# Patient Record
Sex: MALE | Race: WHITE | NOT HISPANIC OR LATINO | Employment: FULL TIME | URBAN - METROPOLITAN AREA
[De-identification: names, ages, dates, MRNs, and addresses within clinical notes are randomized per-mention and may not be internally consistent; named-entity substitution may affect disease eponyms.]

---

## 2018-10-29 ENCOUNTER — CONVERSION ENCOUNTER (OUTPATIENT)
Dept: OTOLARYNGOLOGY | Facility: CLINIC | Age: 53
End: 2018-10-29

## 2018-10-29 ENCOUNTER — OFFICE VISIT CONVERTED (OUTPATIENT)
Dept: OTOLARYNGOLOGY | Facility: CLINIC | Age: 53
End: 2018-10-29
Attending: OTOLARYNGOLOGY

## 2018-12-12 ENCOUNTER — OFFICE VISIT CONVERTED (OUTPATIENT)
Dept: OTOLARYNGOLOGY | Facility: CLINIC | Age: 53
End: 2018-12-12
Attending: OTOLARYNGOLOGY

## 2020-06-26 ENCOUNTER — HOSPITAL ENCOUNTER (OUTPATIENT)
Dept: GASTROENTEROLOGY | Facility: HOSPITAL | Age: 55
Setting detail: HOSPITAL OUTPATIENT SURGERY
Discharge: HOME | End: 2020-06-26
Attending: INTERNAL MEDICINE

## 2020-08-11 ENCOUNTER — OFFICE VISIT CONVERTED (OUTPATIENT)
Dept: GASTROENTEROLOGY | Facility: CLINIC | Age: 55
End: 2020-08-11
Attending: NURSE PRACTITIONER

## 2021-03-31 ENCOUNTER — OFFICE VISIT CONVERTED (OUTPATIENT)
Dept: GASTROENTEROLOGY | Facility: CLINIC | Age: 56
End: 2021-03-31
Attending: NURSE PRACTITIONER

## 2021-05-13 NOTE — PROGRESS NOTES
Progress Note      Patient Name: Josiah Díaz II   Patient ID: [id number]   Sex: Male   YOB: 1965    Primary Care Provider: Antoinette RILEY   Referring Provider: Antoinette RILEY    Visit Date: August 11, 2020    Provider: SHANNA Turner   Location: [facility]   Location Address: [address] [place] [address], [address]  [place], [place]  [id number]   Location Phone: [phone]          Chief Complaint  · Follow up of EGD/Colonoscopy      History Of Present Illness     Patient states that he has been dealing with heartburn for many years now. He is not currently on any medication as he prefers a more holistic approach. He is currently drinking aloe vera gel daily and avoiding spicy and acidic foods.  He also avoids caffeine and NSAID usage. Denies dysphagia, nausea, and vomiting.  Appetite and weight stable.    Bowel movement 1-2 times daily, formed stool.  Denies any abdominal pain, hematochezia, or melena.    FMH: Maternal grandmother- colon cancer, dx age 60's    EGD 6/26/2020:Normal mucosa of duodenum.  Normal mucosa of her stomach.  Hiatal hernia.  Irregularity in the Z line and GE junction.  Erythema in the GE junction compatible with esophagitis.  Colonoscopy 6/26/2020:Mild diverticulosis of the sigmoid colon.  6 mm polyp in the rectum.  Rectal polyp biopsytubular adenoma.  Antrum biopsyintestinal metaplasia consistent with Montez's esophagus.  Squamocolumnar mucosa with reactive changes.  No dysplasia identified.  GE junction biopsyintestinal metaplasia consistent with Montez's esophagus.  Squamocolumnar mucosa with reactive changes.  No dysplasia identified.       Past Medical History  Acid reflux; Chronic rhinosinusitis; Chronic sinusitis; Esophageal erosions; High triglycerides; Hypothyroidism; Rebound headache; Testicular hypofunction, men         Past Surgical History  Back Surgery, Lumbar; Colonoscopy; EGD; Thyroidectomy         Medication List  Cytomel  "5 mcg oral tablet; fenofibrate 50 mg oral capsule; levothyroxine 125 mcg oral tablet; montelukast 10 mg oral tablet; nortriptyline 25 mg oral capsule; propranolol-hydrochlorothiazid 80-25 mg oral tablet         Allergy List  NO KNOWN DRUG ALLERGIES       Allergies Reconciled  Family Medical History  Family history of colon cancer; Family history of bone cancer; Family history of heart disease; Family history of diabetes mellitus type II; Family history of hypothyroidism         Social History  Tobacco (Never)         Review of Systems  · Constitutional  o Denies  o : chills, fever  · Cardiovascular  o Denies  o : chest pain, dyspnea on exertion  · Respiratory  o Denies  o : cough, shortness of breath  · Gastrointestinal  o Admits  o : see HPI   · Endocrine  o Denies  o : weight gain, weight loss      Vitals  Date Time BP Position Site L\R Cuff Size HR RR TEMP (F) WT  HT  BMI kg/m2 BSA m2 O2 Sat HC       08/11/2020 09:22 /73 Sitting    80 - R   210lbs 2oz 6'  2\" 26.98 2.23           Physical Examination  · Constitutional  o Appearance  o : Healthy-appearing, awake and alert in no acute distress  · Head and Face  o Head  o : Normocephalic with no worriesome skin lesions  · Eyes  o Vision  o :   § Visual Fields  § : eyes move symmetrical in all directions  o Sclerae  o : sclerae anicteric  o Pupils and Irises  o : pupils equal and symmetrical  · Neck  o Inspection/Palpation  o : Trachea is midline, no adenopathy  · Respiratory  o Respiratory Effort  o : Breathing is unlabored.  o Inspection of Chest  o : normal appearance  o Auscultation of Lungs  o : Chest is clear to auscultation bilaterally.  · Cardiovascular  o Heart  o :   § Auscultation of Heart  § : no murmurs, rubs, or gallops  o Peripheral Vascular System  o :   § Extremities  § : no cyanosis, clubbing or edema;   · Gastrointestinal  o Abdominal Examination  o : Abdomen is soft, nontender to palpation, with normal active bowel sounds, no appreciable " hepatosplenomegaly.  o Digital Rectal Exam  o : deferred  · Skin and Subcutaneous Tissue  o General Inspection  o : without focal lesions; turgor is normal  · Psychiatric  o General  o : Alert and oriented x3  o Mood and Affect  o : Mood and affect are appropriate to circumstances          Assessment  · Montez's esophagus     530.85  · Diverticulosis Of Colon     562.10/K57.30  · Esophagitis, Reflux     530.11/K21.0  · Gastritis, Other specified     535.40  · Hernia, Hiatal     553.3/K44.9  · Colon polyp     211.3/K63.5      Plan  · Medications  o Protonix 20 mg oral tablet,delayed release (DR/EC)   SIG: take 1 tablet (20 mg) by oral route once daily for 90 days   DISP: (90) tablets with 1 refills  Prescribed on 08/11/2020     o Medications have been Reconciled  · Instructions  o Information given on current diagnoses.  o Lifestyle modifications discussed.  o Discussed risks of long-term PPI use.  o Electronically Identified Patient Education Materials Provided Electronically  · Disposition  o 6 month f/u            Electronically Signed by: SHANNA Turner -Author on August 11, 2020 09:40:22 AM

## 2021-05-14 VITALS
RESPIRATION RATE: 12 BRPM | BODY MASS INDEX: 28.62 KG/M2 | SYSTOLIC BLOOD PRESSURE: 130 MMHG | WEIGHT: 223 LBS | HEIGHT: 74 IN | HEART RATE: 69 BPM | DIASTOLIC BLOOD PRESSURE: 73 MMHG

## 2021-05-14 NOTE — PROGRESS NOTES
Progress Note      Patient Name: Josiah Díaz II   Patient ID: [id number]   Sex: Male   YOB: 1965    Primary Care Provider: Antoinette RILEY   Referring Provider: Antoinette RILEY    Visit Date: March 31, 2021    Provider: SHANNA Turner   Location: [facility]   Location Address: [address] [place] [address], [address]  [place], [place]  [id number]   Location Phone: [phone]          Chief Complaint  · Follow up GERD      History Of Present Illness     Mr. Díaz reports that he is doing well controlling heartburn with Protonix 20 mg as long as he does not overeat or eat close to bedtime. Denies any dysphagia, nausea, vomiting, change in appetite, or weight loss.  Rarely drinks caffeine or uses NSAID.     Bowel movement 1-2 times daily, formed stool.  Denies any abdominal pain, hematochezia, or melena.    FMH: Maternal grandmother- colon cancer, dx age 60's    EGD 6/26/2020:Normal mucosa of duodenum.  Normal mucosa of her stomach.  Hiatal hernia.  Irregularity in the Z line and GE junction.  Erythema in the GE junction compatible with esophagitis.  Colonoscopy 6/26/2020:Mild diverticulosis of the sigmoid colon.  6 mm polyp in the rectum.  Rectal polyp biopsytubular adenoma.  Antrum biopsy-intestinal metaplasia consistent with Montez's esophagus.  Squamocolumnar mucosa with reactive changes.  No dysplasia identified.  GE junction biopsy-intestinal metaplasia consistent with Montez's esophagus.  Squamocolumnar mucosa with reactive changes.  No dysplasia identified.            Past Medical History  Acid reflux; Chronic rhinosinusitis; Chronic sinusitis; Esophageal erosions; High triglycerides; Hypothyroidism; Rebound headache; Testicular hypofunction, men         Past Surgical History  Back Surgery, Lumbar; Colonoscopy; EGD; Thyroidectomy         Medication List  Cytomel 5 mcg oral tablet; fenofibrate 50 mg oral capsule; levothyroxine 125 mcg oral tablet;  "montelukast 10 mg oral tablet; nortriptyline 25 mg oral capsule; propranolol-hydrochlorothiazid 80-25 mg oral tablet; Protonix 20 mg oral tablet,delayed release (DR/EC)         Allergy List  NO KNOWN DRUG ALLERGIES       Allergies Reconciled  Family Medical History  Family history of colon cancer; Family history of bone cancer; Family history of heart disease; Family history of diabetes mellitus type II; Family history of hypothyroidism         Social History  Tobacco (Never)         Review of Systems  · Constitutional  o Denies  o : chills, fever  · Cardiovascular  o Denies  o : chest pain, dyspnea on exertion  · Respiratory  o Denies  o : cough, shortness of breath  · Gastrointestinal  o Admits  o : see HPI   · Endocrine  o Denies  o : weight gain, weight loss      Vitals  Date Time BP Position Site L\R Cuff Size HR RR TEMP (F) WT  HT  BMI kg/m2 BSA m2 O2 Sat FR L/min FiO2 HC       03/31/2021 09:23 /73 Sitting    69 - R 12  222lbs 16oz 6'  2\" 28.63 2.3             Physical Examination  · Constitutional  o Appearance  o : Healthy-appearing, awake and alert in no acute distress  · Head and Face  o Head  o : Normocephalic with no worriesome skin lesions  · Eyes  o Vision  o :   § Visual Fields  § : eyes move symmetrical in all directions  o Sclerae  o : sclerae anicteric  o Pupils and Irises  o : pupils equal and symmetrical  · Neck  o Inspection/Palpation  o : Trachea is midline, no adenopathy  · Respiratory  o Respiratory Effort  o : Breathing is unlabored.  o Inspection of Chest  o : normal appearance  o Auscultation of Lungs  o : Chest is clear to auscultation bilaterally.  · Cardiovascular  o Heart  o :   § Auscultation of Heart  § : no murmurs, rubs, or gallops  o Peripheral Vascular System  o :   § Extremities  § : no cyanosis, clubbing or edema;   · Gastrointestinal  o Abdominal Examination  o : Abdomen is soft, nontender to palpation, with normal active bowel sounds, no appreciable " hepatosplenomegaly.  o Digital Rectal Exam  o : deferred  · Skin and Subcutaneous Tissue  o General Inspection  o : without focal lesions; turgor is normal  · Psychiatric  o General  o : Alert and oriented x3  o Mood and Affect  o : Mood and affect are appropriate to circumstances          Assessment  · Montez's esophagus     530.85      Plan  · Orders  o Consent for Esophagogastrodudodenoscopy (EGD) - Possible risk/complications, benefits, and alternatives to surgical or invasive procedure have been explained to patient and/or legal guardian. -Patient has been evaluated and can tolerate anesthesia and/or sedation. Risk, benefits, and alternatives to anesthesia and sedation have been explained to patient and/or legal guardian. ([id number]) - - 03/31/2021  · Medications  o Protonix 20 mg oral tablet,delayed release (DR/EC)   SIG: take 1 tablet (20 mg) by oral route once daily for 90 days   DISP: (90) Tablet with 1 refills  Refilled on 03/31/2021     o Medications have been Reconciled  · Instructions  o Handouts provided: Pre-procedure instructions including date and time and location of procedure.  o PLAN: Proceed with procedure. Patient understands risks and benefits and is willing to proceed. Understands the risks include, but are not limited to, bleeding and/or perforation.  o Information given on current diagnoses.  o Lifestyle modifications discussed.  o Discussed risks of long-term PPI use.  o Electronically Identified Patient Education Materials Provided Electronically  · Disposition  o Follow up after procedure            Electronically Signed by: SHANNA Turner -Author on March 31, 2021 10:06:18 AM

## 2021-05-15 VITALS
HEIGHT: 74 IN | TEMPERATURE: 97.4 F | BODY MASS INDEX: 29.02 KG/M2 | SYSTOLIC BLOOD PRESSURE: 145 MMHG | HEART RATE: 75 BPM | DIASTOLIC BLOOD PRESSURE: 89 MMHG | OXYGEN SATURATION: 97 % | WEIGHT: 226.12 LBS | RESPIRATION RATE: 16 BRPM

## 2021-05-15 VITALS
SYSTOLIC BLOOD PRESSURE: 122 MMHG | WEIGHT: 210.12 LBS | DIASTOLIC BLOOD PRESSURE: 73 MMHG | HEIGHT: 74 IN | HEART RATE: 80 BPM | BODY MASS INDEX: 26.97 KG/M2

## 2021-05-16 VITALS
BODY MASS INDEX: 29.42 KG/M2 | OXYGEN SATURATION: 98 % | DIASTOLIC BLOOD PRESSURE: 75 MMHG | HEART RATE: 85 BPM | HEIGHT: 74 IN | SYSTOLIC BLOOD PRESSURE: 141 MMHG | TEMPERATURE: 97.5 F | RESPIRATION RATE: 16 BRPM | WEIGHT: 229.25 LBS

## 2021-06-24 ENCOUNTER — TRANSCRIBE ORDERS (OUTPATIENT)
Dept: LAB | Facility: HOSPITAL | Age: 56
End: 2021-06-24

## 2021-06-24 ENCOUNTER — APPOINTMENT (OUTPATIENT)
Dept: LAB | Facility: HOSPITAL | Age: 56
End: 2021-06-24

## 2021-06-24 DIAGNOSIS — Z01.818 PRE-OP TESTING: Primary | ICD-10-CM

## 2021-06-24 RX ORDER — MONTELUKAST SODIUM 10 MG/1
TABLET ORAL
COMMUNITY
End: 2022-10-25

## 2021-06-24 RX ORDER — PANTOPRAZOLE SODIUM 20 MG/1
20 TABLET, DELAYED RELEASE ORAL DAILY
COMMUNITY
End: 2021-10-11

## 2021-06-24 RX ORDER — FENOFIBRATE 50 MG/1
CAPSULE ORAL
COMMUNITY
End: 2022-10-25

## 2021-06-24 RX ORDER — LEVOTHYROXINE SODIUM 0.12 MG/1
TABLET ORAL
COMMUNITY

## 2021-06-24 RX ORDER — LIOTHYRONINE SODIUM 5 UG/1
TABLET ORAL
COMMUNITY
End: 2021-10-27

## 2021-06-24 RX ORDER — PROPRANOLOL HYDROCHLORIDE AND HYDROCHLOROTHIAZIDE 80; 25 MG/1; MG/1
TABLET ORAL
COMMUNITY
End: 2021-10-27

## 2021-06-25 ENCOUNTER — LAB (OUTPATIENT)
Dept: LAB | Facility: HOSPITAL | Age: 56
End: 2021-06-25

## 2021-06-25 DIAGNOSIS — Z01.818 PRE-OP TESTING: ICD-10-CM

## 2021-06-25 LAB — SARS-COV-2 RNA RESP QL NAA+PROBE: NOT DETECTED

## 2021-06-25 PROCEDURE — C9803 HOPD COVID-19 SPEC COLLECT: HCPCS

## 2021-06-25 PROCEDURE — U0003 INFECTIOUS AGENT DETECTION BY NUCLEIC ACID (DNA OR RNA); SEVERE ACUTE RESPIRATORY SYNDROME CORONAVIRUS 2 (SARS-COV-2) (CORONAVIRUS DISEASE [COVID-19]), AMPLIFIED PROBE TECHNIQUE, MAKING USE OF HIGH THROUGHPUT TECHNOLOGIES AS DESCRIBED BY CMS-2020-01-R: HCPCS

## 2021-06-29 ENCOUNTER — ANESTHESIA (OUTPATIENT)
Dept: GASTROENTEROLOGY | Facility: HOSPITAL | Age: 56
End: 2021-06-29

## 2021-06-29 ENCOUNTER — ANESTHESIA EVENT (OUTPATIENT)
Dept: GASTROENTEROLOGY | Facility: HOSPITAL | Age: 56
End: 2021-06-29

## 2021-06-29 ENCOUNTER — HOSPITAL ENCOUNTER (OUTPATIENT)
Facility: HOSPITAL | Age: 56
Setting detail: HOSPITAL OUTPATIENT SURGERY
Discharge: HOME | End: 2021-06-29
Attending: INTERNAL MEDICINE | Admitting: INTERNAL MEDICINE

## 2021-06-29 VITALS
DIASTOLIC BLOOD PRESSURE: 78 MMHG | TEMPERATURE: 97.4 F | SYSTOLIC BLOOD PRESSURE: 119 MMHG | HEART RATE: 73 BPM | OXYGEN SATURATION: 95 % | RESPIRATION RATE: 16 BRPM | BODY MASS INDEX: 27.9 KG/M2 | HEIGHT: 74 IN | WEIGHT: 217.37 LBS

## 2021-06-29 DIAGNOSIS — K22.70 BARRETT'S ESOPHAGUS: ICD-10-CM

## 2021-06-29 PROCEDURE — 43239 EGD BIOPSY SINGLE/MULTIPLE: CPT | Performed by: INTERNAL MEDICINE

## 2021-06-29 PROCEDURE — 88305 TISSUE EXAM BY PATHOLOGIST: CPT | Performed by: INTERNAL MEDICINE

## 2021-06-29 PROCEDURE — 25010000002 PROPOFOL 10 MG/ML EMULSION: Performed by: NURSE ANESTHETIST, CERTIFIED REGISTERED

## 2021-06-29 RX ORDER — LIDOCAINE HYDROCHLORIDE 20 MG/ML
INJECTION, SOLUTION INFILTRATION; PERINEURAL AS NEEDED
Status: DISCONTINUED | OUTPATIENT
Start: 2021-06-29 | End: 2021-06-29 | Stop reason: SURG

## 2021-06-29 RX ORDER — PROPOFOL 10 MG/ML
VIAL (ML) INTRAVENOUS CONTINUOUS PRN
Status: DISCONTINUED | OUTPATIENT
Start: 2021-06-29 | End: 2021-06-29 | Stop reason: SURG

## 2021-06-29 RX ORDER — SODIUM CHLORIDE, SODIUM LACTATE, POTASSIUM CHLORIDE, CALCIUM CHLORIDE 600; 310; 30; 20 MG/100ML; MG/100ML; MG/100ML; MG/100ML
30 INJECTION, SOLUTION INTRAVENOUS CONTINUOUS
Status: DISCONTINUED | OUTPATIENT
Start: 2021-06-29 | End: 2021-06-29 | Stop reason: HOSPADM

## 2021-06-29 RX ADMIN — SODIUM CHLORIDE, POTASSIUM CHLORIDE, SODIUM LACTATE AND CALCIUM CHLORIDE 30 ML/HR: 600; 310; 30; 20 INJECTION, SOLUTION INTRAVENOUS at 10:24

## 2021-06-29 RX ADMIN — PROPOFOL 175 MCG/KG/MIN: 10 INJECTION, EMULSION INTRAVENOUS at 10:55

## 2021-06-29 RX ADMIN — LIDOCAINE HYDROCHLORIDE 50 MG: 20 INJECTION, SOLUTION INFILTRATION; PERINEURAL at 10:55

## 2021-06-29 NOTE — ANESTHESIA PREPROCEDURE EVALUATION
Anesthesia Evaluation     Patient summary reviewed and Nursing notes reviewed   no history of anesthetic complications:  NPO Solid Status: > 8 hours  NPO Liquid Status: > 2 hours           Airway   Mallampati: II  TM distance: >3 FB  Neck ROM: full  No difficulty expected  Dental - normal exam     Pulmonary - negative pulmonary ROS and normal exam    breath sounds clear to auscultation  Cardiovascular - negative cardio ROS and normal exam  Exercise tolerance: good (4-7 METS)    Rhythm: regular        Neuro/Psych- negative ROS  GI/Hepatic/Renal/Endo    (+)  GERD well controlled,      Musculoskeletal (-) negative ROS    Abdominal    Substance History - negative use     OB/GYN negative ob/gyn ROS         Other - negative ROS                       Anesthesia Plan    ASA 2     general and MAC     intravenous induction     Anesthetic plan, all risks, benefits, and alternatives have been provided, discussed and informed consent has been obtained with: patient.

## 2021-06-29 NOTE — ANESTHESIA POSTPROCEDURE EVALUATION
Patient: Josiah Díaz II    Procedure Summary     Date: 06/29/21 Room / Location: [facility] ENDOSCOPY 3 / [facility]    Anesthesia Start: 1052 Anesthesia Stop: 1114    Procedure: ESOPHAGOGASTRODUODENOSCOPY WITH BIOPSY (N/A ) Diagnosis: (MALATHI'S ESOPHAGUS)    Surgeons: Bernice Hagen MD Provider: Radames Kiran MD    Anesthesia Type: general, MAC ASA Status: 2          Anesthesia Type: general, MAC    Vitals  Vitals Value Taken Time   /69 06/29/21 1113   Temp 36.2 °C (97.2 °F) 06/29/21 1113   Pulse 75 06/29/21 1113   Resp 14 06/29/21 1113   SpO2 95 % 06/29/21 1113           Post Anesthesia Care and Evaluation    Patient location during evaluation: bedside  Patient participation: complete - patient participated  Level of consciousness: awake  Pain score: 0  Pain management: adequate  Airway patency: patent  Anesthetic complications: No anesthetic complications  PONV Status: none  Cardiovascular status: acceptable and stable  Respiratory status: acceptable and room air  Hydration status: acceptable

## 2021-06-30 LAB
CYTO UR: NORMAL
LAB AP CASE REPORT: NORMAL
LAB AP CLINICAL INFORMATION: NORMAL
PATH REPORT.FINAL DX SPEC: NORMAL
PATH REPORT.GROSS SPEC: NORMAL

## 2021-07-12 ENCOUNTER — TELEPHONE (OUTPATIENT)
Dept: GASTROENTEROLOGY | Facility: CLINIC | Age: 56
End: 2021-07-12

## 2021-07-12 NOTE — TELEPHONE ENCOUNTER
Called patient and advised scope results and recommendation. Scheduled f/u appt      ----- Message from Odilia Mustafa sent at 7/8/2021  2:08 PM EDT -----    ----- Message -----  From: Bernice Hagen MD  Sent: 6/30/2021   3:50 PM EDT  To: Odilia Mustafa    Esophagus biopsies with Montez's with no dysplasia.  Recall EGD in 3 years.  Please arrange f/u appointment.

## 2021-10-11 RX ORDER — PANTOPRAZOLE SODIUM 20 MG/1
TABLET, DELAYED RELEASE ORAL
Qty: 90 TABLET | Refills: 3 | Status: SHIPPED | OUTPATIENT
Start: 2021-10-11 | End: 2021-10-27 | Stop reason: SDUPTHER

## 2021-10-27 ENCOUNTER — OFFICE VISIT (OUTPATIENT)
Dept: GASTROENTEROLOGY | Facility: CLINIC | Age: 56
End: 2021-10-27

## 2021-10-27 VITALS
SYSTOLIC BLOOD PRESSURE: 124 MMHG | DIASTOLIC BLOOD PRESSURE: 60 MMHG | BODY MASS INDEX: 29.54 KG/M2 | HEART RATE: 78 BPM | WEIGHT: 230.16 LBS | HEIGHT: 74 IN

## 2021-10-27 DIAGNOSIS — K22.70 BARRETT'S ESOPHAGUS WITHOUT DYSPLASIA: Primary | ICD-10-CM

## 2021-10-27 PROCEDURE — 99212 OFFICE O/P EST SF 10 MIN: CPT | Performed by: NURSE PRACTITIONER

## 2021-10-27 RX ORDER — PROPRANOLOL HYDROCHLORIDE 80 MG/1
CAPSULE, EXTENDED RELEASE ORAL
COMMUNITY
Start: 2021-08-19 | End: 2021-10-27

## 2021-10-27 RX ORDER — PANTOPRAZOLE SODIUM 20 MG/1
20 TABLET, DELAYED RELEASE ORAL DAILY
Qty: 90 TABLET | Refills: 3 | Status: SHIPPED | OUTPATIENT
Start: 2021-10-27 | End: 2021-10-28

## 2021-10-27 RX ORDER — LIOTHYRONINE SODIUM 25 UG/1
TABLET ORAL
COMMUNITY
Start: 2021-10-24

## 2021-10-27 RX ORDER — TESTOSTERONE CYPIONATE 200 MG/ML
INJECTION, SOLUTION INTRAMUSCULAR
COMMUNITY
Start: 2021-10-08

## 2021-10-27 NOTE — PROGRESS NOTES
Chief Complaint  Follow-up (egd)    History of Present Illness  Josiah Díaz II is a 56 y.o. who presents to [facility] for follow up of Follow-up (egd).    Mr. Díaz presents today for follow-up EGD due to Montez's esophagus.  Biopsies were consistent with Montez's however no dysplasia noted.  Continues to take pantoprazole 20 mg once daily.  He denies any heartburn, nausea, vomiting, change in appetite, weight loss.  Avoids frequent NSAID usage.    Labs Result Review Imaging    Past Medical History:   Diagnosis Date   • Acid reflux    • Chronic rhinosinusitis    • Chronic sinusitis    • Esophageal erosions    • High triglycerides    • Hypothyroidism     NODULE-   • Rebound headache    • Testicular hypofunction        Past Surgical History:   Procedure Laterality Date   • BACK SURGERY  2000    lumbar   • COLONOSCOPY  2020   • ENDOSCOPY  2020   • ENDOSCOPY N/A 6/29/2021    Procedure: ESOPHAGOGASTRODUODENOSCOPY WITH BIOPSY;  Surgeon: Bernice Hagen MD;  Location: [facility] END[facility]COPY;  Service: Gastroenterology;  Laterality: N/A;  BARRETTS ESOPHAGUS/ HIATAL HERNIA   • THYROIDECTOMY  2008   • UPPER GASTROINTESTINAL ENDOSCOPY         Current Outpatient Medications on File Prior to Visit   Medication Sig Dispense Refill   • Fenofibrate 50 MG capsule fenofibrate 50 mg oral capsule take 1 capsule (50 mg) by oral route once daily with a meal   Active     • Flaxseed, Linseed, (Flaxseed Oil) oil Take  by mouth Daily.     • levothyroxine (SYNTHROID, LEVOTHROID) 125 MCG tablet levothyroxine 125 mcg oral tablet take 1 tablet (125 mcg) by oral route once daily   Active     • liothyronine (CYTOMEL) 25 MCG tablet      • montelukast (SINGULAIR) 10 MG tablet montelukast 10 mg oral tablet take 1 tablet (10 mg) by oral route once daily in the evening   Active     • Testosterone Cypionate (DEPOTESTOTERONE CYPIONATE) 200 MG/ML injection ADMINISTER 1/2 ML IN THE MUSCLE WEEKLY     •  "[DISCONTINUED] pantoprazole (PROTONIX) 20 MG EC tablet TAKE 1 TABLET DAILY 90 tablet 3   • [DISCONTINUED] propranolol-hydrochlorothiazide (INDERIDE) 80-25 MG per tablet propranolol-hydrochlorothiazid 80-25 mg oral tablet take 1 tablet by oral route daily   Active     • [DISCONTINUED] liothyronine (Cytomel) 5 MCG tablet Cytomel 5 mcg oral tablet take 6 tablets by oral route daily   Active     • [DISCONTINUED] propranolol LA (INDERAL LA) 80 MG 24 hr capsule      • [DISCONTINUED] TESTOSTERONE IM Inject 0.5 mL into the appropriate muscle as directed by prescriber Every 30 (Thirty) Days.       No current facility-administered medications on file prior to visit.       Social History     Social History Narrative   • Not on file         Objective     Review of Systems   Constitutional: Negative for appetite change and unexpected weight loss.   Gastrointestinal: Negative for abdominal pain, nausea, vomiting and GERD.        Vital Signs:   /60 (BP Location: Right arm, Patient Position: Sitting, Cuff Size: Adult)   Pulse 78   Ht 188 cm (74\")   Wt 104 kg (230 lb 2.6 oz)   BMI 29.55 kg/m²       Physical Exam  Constitutional:       General: He is not in acute distress.     Appearance: Normal appearance. He is well-developed and normal weight.   HENT:      Head: Normocephalic and atraumatic.   Eyes:      Conjunctiva/sclera: Conjunctivae normal.      Pupils: Pupils are equal, round, and reactive to light.      Visual Fields: Right eye visual fields normal and left eye visual fields normal.   Cardiovascular:      Rate and Rhythm: Normal rate and regular rhythm.      Heart sounds: Normal heart sounds.   Pulmonary:      Effort: Pulmonary effort is normal. No retractions.      Breath sounds: Normal breath sounds and air entry.   Abdominal:      General: Bowel sounds are normal.      Palpations: Abdomen is soft.      Tenderness: There is no abdominal tenderness.      Comments: No appreciable hepatosplenomegaly or ascites "   Musculoskeletal:         General: Normal range of motion.      Cervical back: Neck supple.      Right lower leg: No edema.      Left lower leg: No edema.   Lymphadenopathy:      Cervical: No cervical adenopathy.   Skin:     General: Skin is warm and dry.      Findings: No lesion.   Neurological:      General: No focal deficit present.      Mental Status: He is alert and oriented to person, place, and time.   Psychiatric:         Mood and Affect: Mood and affect normal.         Behavior: Behavior normal.         Result Review :   The following data was reviewed by: SHANNA Turner on 10/27/2021:      No results found for: IRON, TIBC, FERRITIN, LABIRON      EGD 6/29/2021: Esophageal mucosa changes classified as Montez's stage C0 -M1 per [place] criteria.  Small hiatal hernia.  Erythematous mucosa in the antrum.  Antrum biopsy-no significant pathologic change.  GE junction biopsy-squamocolumnar mucosa with intestinal metaplasia.  Negative for dysplasia and malignancy.     Assessment and Plan    Diagnoses and all orders for this visit:    1. Montez's esophagus without dysplasia (Primary)    Other orders  -     pantoprazole (PROTONIX) 20 MG EC tablet; Take 1 tablet by mouth Daily.  Dispense: 90 tablet; Refill: 3      * Surgery not found *     Due for repeat EGD 6/2024 for surveillance of Montez's esophagus.    Follow Up   Return in about 1 year (around 10/27/2022).  Patient was given instructions and counseling regarding his condition or for health maintenance advice. Please see specific information pulled into the AVS if appropriate.

## 2021-10-28 ENCOUNTER — TELEPHONE (OUTPATIENT)
Dept: GASTROENTEROLOGY | Facility: CLINIC | Age: 56
End: 2021-10-28

## 2021-10-28 RX ORDER — PANTOPRAZOLE SODIUM 20 MG/1
20 TABLET, DELAYED RELEASE ORAL DAILY
Qty: 90 TABLET | Refills: 3 | Status: SHIPPED | OUTPATIENT
Start: 2021-10-28

## 2021-10-28 NOTE — TELEPHONE ENCOUNTER
Pt call to let us know that his prescription should be going through [facility] and ask if Delfin could correct it . It was sent to [facility].

## 2022-10-25 ENCOUNTER — OFFICE VISIT (OUTPATIENT)
Dept: GASTROENTEROLOGY | Facility: CLINIC | Age: 57
End: 2022-10-25

## 2022-10-25 VITALS
DIASTOLIC BLOOD PRESSURE: 69 MMHG | SYSTOLIC BLOOD PRESSURE: 136 MMHG | HEART RATE: 89 BPM | WEIGHT: 211.8 LBS | BODY MASS INDEX: 27.18 KG/M2 | OXYGEN SATURATION: 98 % | HEIGHT: 74 IN

## 2022-10-25 DIAGNOSIS — K22.70 BARRETT'S ESOPHAGUS WITHOUT DYSPLASIA: Primary | ICD-10-CM

## 2022-10-25 PROCEDURE — 99212 OFFICE O/P EST SF 10 MIN: CPT | Performed by: NURSE PRACTITIONER

## 2022-10-25 RX ORDER — LANSOPRAZOLE 30 MG/1
30 CAPSULE, DELAYED RELEASE ORAL
COMMUNITY

## 2022-10-25 RX ORDER — LIOTHYRONINE SODIUM 5 UG/1
5 TABLET ORAL
COMMUNITY

## 2022-10-25 RX ORDER — LEVOTHYROXINE SODIUM 0.15 MG/1
150 TABLET ORAL DAILY
COMMUNITY

## 2022-10-25 RX ORDER — TAMSULOSIN HYDROCHLORIDE 0.4 MG/1
CAPSULE ORAL
COMMUNITY
Start: 2022-10-12

## 2022-10-25 NOTE — PROGRESS NOTES
Chief Complaint  Follow-up (1 year follow up )    History of Present Illness  Josiah Díaz II is a 57 y.o. who presents to [facility] GASTROENTEROLOGY for follow up of Follow-up (1 year follow up )     Mr. Díaz presents today for f/u pantoja's esophagus. Reports he is doing well and no longer taking PPI. Eating more of a clean diet and fasting for at least 6 hours a day. Since then he no longer feels any HB or acid reflux. Denies any nausea, vomiting, or dysphagia. Only caffeine he consumes is from Mehran liquid drink mix. Denies frequent NSAID usage.     Labs Result Review Imaging    Past Medical History:   Diagnosis Date   • Acid reflux    • Chronic rhinosinusitis    • Chronic sinusitis    • Esophageal erosions    • High triglycerides    • Hypothyroidism     NODULE-   • Rebound headache    • Testicular hypofunction        Past Surgical History:   Procedure Laterality Date   • BACK SURGERY  2000    lumbar   • COLONOSCOPY  2020   • ENDOSCOPY  2020   • ENDOSCOPY N/A 6/29/2021    Procedure: ESOPHAGOGASTRODUODENOSCOPY WITH BIOPSY;  Surgeon: Bernice Hagen MD;  Location: [facility] ENDOSCOPY;  Service: Gastroenterology;  Laterality: N/A;  BARRETTS ESOPHAGUS/ HIATAL HERNIA   • THYROIDECTOMY  2008   • UPPER GASTROINTESTINAL ENDOSCOPY         Current Outpatient Medications on File Prior to Visit   Medication Sig Dispense Refill   • Flaxseed, Linseed, (Flaxseed Oil) oil Take  by mouth Daily.     • levothyroxine (SYNTHROID, LEVOTHROID) 125 MCG tablet levothyroxine 125 mcg oral tablet take 1 tablet (125 mcg) by oral route once daily   Active     • levothyroxine (SYNTHROID, LEVOTHROID) 150 MCG tablet Take 1 tablet by mouth Daily.     • liothyronine (CYTOMEL) 25 MCG tablet      • tamsulosin (FLOMAX) 0.4 MG capsule 24 hr capsule      • Testosterone Cypionate (DEPOTESTOTERONE CYPIONATE) 200 MG/ML injection ADMINISTER 1/2 ML IN THE MUSCLE WEEKLY     • lansoprazole (PREVACID) 30 MG capsule Take 1  "capsule by mouth.     • liothyronine (CYTOMEL) 5 MCG tablet Take 1 tablet by mouth.     • pantoprazole (PROTONIX) 20 MG EC tablet Take 1 tablet by mouth Daily. 90 tablet 3   • [DISCONTINUED] Fenofibrate 50 MG capsule fenofibrate 50 mg oral capsule take 1 capsule (50 mg) by oral route once daily with a meal   Active     • [DISCONTINUED] montelukast (SINGULAIR) 10 MG tablet montelukast 10 mg oral tablet take 1 tablet (10 mg) by oral route once daily in the evening   Active       No current facility-administered medications on file prior to visit.       Social History     Social History Narrative   • Not on file         Objective     Review of Systems   Gastrointestinal: Negative for GERD.        Vital Signs:   /69 (BP Location: Right arm, Patient Position: Sitting, Cuff Size: Small Adult)   Pulse 89   Ht 188 cm (74.02\")   Wt 96.1 kg (211 lb 12.8 oz)   SpO2 98%   BMI 27.18 kg/m²       Physical Exam  Constitutional:       General: He is not in acute distress.     Appearance: Normal appearance. He is well-developed and normal weight.   HENT:      Head: Normocephalic and atraumatic.   Eyes:      Conjunctiva/sclera: Conjunctivae normal.      Pupils: Pupils are equal, round, and reactive to light.      Visual Fields: Right eye visual fields normal and left eye visual fields normal.   Cardiovascular:      Rate and Rhythm: Normal rate and regular rhythm.      Heart sounds: Normal heart sounds.   Pulmonary:      Effort: Pulmonary effort is normal. No retractions.      Breath sounds: Normal breath sounds and air entry.   Abdominal:      General: Bowel sounds are normal. There is no distension.      Palpations: Abdomen is soft.      Tenderness: There is no abdominal tenderness.      Comments: No appreciable hepatosplenomegaly or ascites   Musculoskeletal:         General: Normal range of motion.      Cervical back: Normal range of motion and neck supple.   Skin:     General: Skin is warm and dry.   Neurological:      " Mental Status: He is alert and oriented to person, place, and time.   Psychiatric:         Mood and Affect: Mood and affect normal.         Behavior: Behavior normal.         Result Review :   The following data was reviewed by: SHANNA Turner on 10/25/2022:      No results found for: LIPASE, AMYLASE, IRON, TIBC, LABIRON, TRANSFERRIN, FERRITIN, SEDRATE, CRP, AFPTM, DSDNA, EXPANDEDENA, SMOOTHMUSCAB, CERULOPLSM, LABIMMURE, TOTIGGREF, IGA, IGM, MITOAB, HEPBSAG, HEPAIGM, HEPBIGMCORE, HEPCVIRUSABY, PROTIME, INR, AMMONIA     Due for repeat EGD 6/2024 for surveillance of Montez's esophagus.    EGD 6/29/2021: Esophageal mucosa changes classified as Montez's stage C0 -M1 per [facility] criteria.  Small hiatal hernia.  Erythematous mucosa in the antrum.  Antrum biopsy-no significant pathologic change.  GE junction biopsy-squamocolumnar mucosa with intestinal metaplasia.  Negative for dysplasia and malignancy.        Assessment and Plan    Diagnoses and all orders for this visit:    1. Montez's esophagus without dysplasia (Primary)      * Surgery not found *     Educated patient on risk versus benefit of not taking PPI with Montez's esophagus.  Patient states he understands.    Follow Up   Return in about 1 year (around 10/25/2023).  Patient was given instructions and counseling regarding his condition or for health maintenance advice. Please see specific information pulled into the AVS if appropriate.

## 2023-02-24 ENCOUNTER — TELEPHONE (OUTPATIENT)
Dept: GASTROENTEROLOGY | Facility: CLINIC | Age: 58
End: 2023-02-24
Payer: COMMERCIAL

## 2023-02-24 NOTE — TELEPHONE ENCOUNTER
Called and spoke with patient regarding scheduling an appointment with Bhavna Gallagher. Patient agreed to be scheduled and was scheduled for 10/25/23. Patient verbalized understanding.

## 2024-01-03 ENCOUNTER — OFFICE VISIT (OUTPATIENT)
Dept: GASTROENTEROLOGY | Facility: CLINIC | Age: 59
End: 2024-01-03
Payer: COMMERCIAL

## 2024-01-03 VITALS
DIASTOLIC BLOOD PRESSURE: 88 MMHG | SYSTOLIC BLOOD PRESSURE: 166 MMHG | WEIGHT: 217.8 LBS | HEART RATE: 69 BPM | BODY MASS INDEX: 27.95 KG/M2 | HEIGHT: 74 IN

## 2024-01-03 DIAGNOSIS — Z86.010 HISTORY OF COLON POLYPS: ICD-10-CM

## 2024-01-03 DIAGNOSIS — Z80.0 FH: COLON CANCER: ICD-10-CM

## 2024-01-03 DIAGNOSIS — K22.70 BARRETT'S ESOPHAGUS WITHOUT DYSPLASIA: Primary | ICD-10-CM

## 2024-01-03 DIAGNOSIS — K21.9 GASTROESOPHAGEAL REFLUX DISEASE WITHOUT ESOPHAGITIS: ICD-10-CM

## 2024-01-03 DIAGNOSIS — K44.9 HIATAL HERNIA: ICD-10-CM

## 2024-01-03 PROCEDURE — 99214 OFFICE O/P EST MOD 30 MIN: CPT | Performed by: NURSE PRACTITIONER

## 2024-01-03 RX ORDER — PANTOPRAZOLE SODIUM 20 MG/1
20 TABLET, DELAYED RELEASE ORAL DAILY
Qty: 90 TABLET | Refills: 3 | Status: SHIPPED | OUTPATIENT
Start: 2024-01-03

## 2024-01-03 NOTE — PROGRESS NOTES
"Chief Complaint   Barretts Esophagus     History of Present Illness       Josiah Díaz II is a 58 y.o. male who presents to [facility] GASTROENTEROLOGY for follow-up for Montez's esophagus.  He is new to me today.  He was last seen in the office by nurse practitioner Don Lenz on 10/25/22.    He denies any reflux today.  Has been off of Protonix for quite a while now.  Did not realize he needed to continue taking it.  Denies any dysphagia.    He underwent EGD with Dr. Hagen on 6/29/2021.  EGD showed small hiatal hernia with gastritis.  Montez's suspected.  Path positive for reflux esophagitis and Montez's.  Repeat EGD in 3 years.    Last colonoscopy was in 2020. History colon polyps. Recall 2025.     Reports his bowels move well. Denies any rectal bleeding or melena. Good appetite.     GI family history---Maternal grandmother with colon cancer.   Results       Result Review :                 Lipase No results found for: \"LIPASE\"  Amylase No results found for: \"AMYLASE\"            Past Medical History       Past Medical History:   Diagnosis Date    Acid reflux     Chronic rhinosinusitis     Chronic sinusitis     Esophageal erosions     High triglycerides     Hypothyroidism     NODULE-    Rebound headache     Testicular hypofunction        Past Surgical History:   Procedure Laterality Date    BACK SURGERY  2000    lumbar    COLONOSCOPY  2020    ENDOSCOPY  2020    ENDOSCOPY N/A 6/29/2021    Procedure: ESOPHAGOGASTRODUODENOSCOPY WITH BIOPSY;  Surgeon: Bernice Hagen MD;  Location: [facility] ENDOSCOPY;  Service: Gastroenterology;  Laterality: N/A;  BARRETTS ESOPHAGUS/ HIATAL HERNIA    THYROIDECTOMY  2008    UPPER GASTROINTESTINAL ENDOSCOPY           Current Outpatient Medications:     Flaxseed, Linseed, (Flaxseed Oil) oil, Take  by mouth Daily., Disp: , Rfl:     levothyroxine (SYNTHROID, LEVOTHROID) 125 MCG tablet, levothyroxine 125 mcg oral tablet take 1 tablet (125 mcg) by oral " "route once daily   Active, Disp: , Rfl:     liothyronine (CYTOMEL) 25 MCG tablet, , Disp: , Rfl:     pantoprazole (PROTONIX) 20 MG EC tablet, Take 1 tablet by mouth Daily., Disp: 90 tablet, Rfl: 3    tamsulosin (FLOMAX) 0.4 MG capsule 24 hr capsule, , Disp: , Rfl:     Testosterone Cypionate (DEPOTESTOTERONE CYPIONATE) 200 MG/ML injection, ADMINISTER 1/2 ML IN THE MUSCLE WEEKLY, Disp: , Rfl:      No Known Allergies    Family History   Problem Relation Age of Onset    Hypothyroidism Mother     Bone cancer Father     Diabetes Father     Colon cancer Maternal Grandmother     Heart disease Other     Malig Hyperthermia Neg Hx         Social History     Social History Narrative    Not on file       Objective       Review of Systems   Constitutional:  Negative for appetite change, fatigue, fever, unexpected weight gain and unexpected weight loss.   HENT:  Negative for trouble swallowing.    Respiratory:  Negative for cough, choking, chest tightness, shortness of breath, wheezing and stridor.    Cardiovascular:  Negative for chest pain, palpitations and leg swelling.   Gastrointestinal:  Negative for abdominal distention, abdominal pain, anal bleeding, blood in stool, constipation, diarrhea, nausea, rectal pain, vomiting, GERD and indigestion.        Vital Signs:   /88 (BP Location: Right arm, Patient Position: Sitting, Cuff Size: Adult)   Pulse 69   Ht 188 cm (74.02\")   Wt 98.8 kg (217 lb 12.8 oz)   BMI 27.95 kg/m²       Physical Exam  Constitutional:       General: He is not in acute distress.     Appearance: He is well-developed. He is not ill-appearing.   HENT:      Head: Normocephalic.   Eyes:      Pupils: Pupils are equal, round, and reactive to light.   Cardiovascular:      Rate and Rhythm: Normal rate and regular rhythm.      Heart sounds: Normal heart sounds.   Pulmonary:      Effort: Pulmonary effort is normal.      Breath sounds: Normal breath sounds.   Abdominal:      General: Bowel sounds are normal. " There is no distension.      Palpations: Abdomen is soft. There is no mass.      Tenderness: There is no abdominal tenderness. There is no guarding or rebound.      Hernia: No hernia is present.   Musculoskeletal:         General: Normal range of motion.   Skin:     General: Skin is warm and dry.   Neurological:      Mental Status: He is alert and oriented to person, place, and time.   Psychiatric:         Speech: Speech normal.         Behavior: Behavior normal.         Judgment: Judgment normal.           Assessment & Plan          Assessment and Plan    Diagnoses and all orders for this visit:    1. Montez's esophagus without dysplasia (Primary)  -     pantoprazole (PROTONIX) 20 MG EC tablet; Take 1 tablet by mouth Daily.  Dispense: 90 tablet; Refill: 3  -     Case Request; Standing  -     Follow Anesthesia Guidelines / Protocol; Future  -     Obtain Informed Consent; Future  -     Case Request    2. Gastroesophageal reflux disease without esophagitis  -     Case Request; Standing  -     Follow Anesthesia Guidelines / Protocol; Future  -     Obtain Informed Consent; Future  -     Case Request    3. Hiatal hernia  -     Case Request; Standing  -     Follow Anesthesia Guidelines / Protocol; Future  -     Obtain Informed Consent; Future  -     Case Request    4. History of colon polyps    5. FH: colon cancer    Other orders  -     Verify NPO; Standing      Reviewed medical history with him today.  GERD seems well-controlled on diet alone currently.  I did explain to him with his history of Montez's esophagus he needs to remain on a PPI until he has eradicated Montez's altogether.  I have sent in a new prescription for the Protonix 20 mg once a day.  Continue GERD precautions.  He is due for surveillance of his Montez's esophagus this year.  Will go ahead and get him on the scope schedule with Dr. Hagen for further evaluation.  Patient is agreeable to the scope.  Next screening colonoscopy due in 2025.  Bowels  moving well currently.  Patient to call the office with any issues.  Patient to follow-up with me after his scope.  Patient is agreeable to the plan.    The risk of the endoscopy were discussed in detail. Possible risks/complications, benefits, and alternatives to surgical or invasive procedure have been explained to patient and/or legal guardian; risks include bleeding, infection, and perforation. Patient has been evaluated and can tolerate anesthesia and/or sedation.           Follow Up       Follow Up   Return for F/U AFTER PROCEDURE.  Patient was given instructions and counseling regarding his condition or for health maintenance advice. Please see specific information pulled into the AVS if appropriate.

## 2024-01-08 ENCOUNTER — TELEPHONE (OUTPATIENT)
Dept: GASTROENTEROLOGY | Facility: CLINIC | Age: 59
End: 2024-01-08
Payer: COMMERCIAL

## 2024-01-08 NOTE — TELEPHONE ENCOUNTER
Josiah Díaz II  1965    Patient requested to Reschedule their EGD. I have offered to reschedule this patient and patient has agreed. Patient has been rescheduled to 06/27/2024 at 0600 am.    Reason for cancelling/rescheduling: conflict with work scheduled     This procedure was ordered by SHANNA Colon for an important reason. We want to inform you that there are risks associated with not proceeding with the procedure at this time such as a delay in diagnosis, risk of incurable disease, or cancer.    Patient plans to call us back to reschedule: NO    Updated clearance needed?: NO    Patient verbalized understanding for all of the above information.

## 2024-01-08 NOTE — TELEPHONE ENCOUNTER
Caller: Josiah Díaz II    Relationship to patient: Self    Best call back number: [phone]    Patient is needing: PT NEEDS TO RESCHEDULE THE COLONOSCOPY ON 6/25/24 DUE TO A SCHEDULING CONFLICT. PT WANTED TO NOW IF THERE WAS ANYTHING AVAILABLE ON 6/19/24. PLEASE CALL BACK AND ADVISE.

## 2024-06-19 NOTE — PRE-PROCEDURE INSTRUCTIONS
"Instructed on date and arrival time of 0600. Come to entrance \"C\".  Must have  over age 18 to drive home.  May have two visitors; however, children under 12 must stay in waiting room.  Discussed diet/NPO.  May take medications as usual except for blood thinners, diabetic medications, or weight loss medications.  Verbalized understanding of instructions given.  Instructed to call for questions or concerns.  "

## 2024-06-26 ENCOUNTER — ANESTHESIA EVENT (OUTPATIENT)
Dept: GASTROENTEROLOGY | Facility: HOSPITAL | Age: 59
End: 2024-06-26
Payer: COMMERCIAL

## 2024-06-26 NOTE — ANESTHESIA PREPROCEDURE EVALUATION
Anesthesia Evaluation     Nursing notes reviewed   NPO Solid Status: > 8 hours  NPO Liquid Status: > 4 hours           Airway   Mallampati: II  TM distance: >3 FB  Neck ROM: full  No difficulty expected  Dental - normal exam     Pulmonary     breath sounds clear to auscultation  Cardiovascular - normal exam  Exercise tolerance: good (4-7 METS)    Rhythm: regular  Rate: normal    (+) hyperlipidemia      Neuro/Psych  (+) headaches  GI/Hepatic/Renal/Endo    (+) hiatal hernia, GERD well controlled, thyroid problem hypothyroidism    Musculoskeletal     Abdominal    Substance History      OB/GYN          Other        ROS/Med Hx Other: Hx Montez's                 Anesthesia Plan    ASA 2     general   total IV anesthesia  (Total IV Anesthesia    Patient understands anesthesia not responsible for dental damage.  )  intravenous induction     Anesthetic plan, risks, benefits, and alternatives have been provided, discussed and informed consent has been obtained with: patient and spouse/significant other.  Pre-procedure education provided  Plan discussed with CRNA.      CODE STATUS:

## 2024-06-27 ENCOUNTER — ANESTHESIA (OUTPATIENT)
Dept: GASTROENTEROLOGY | Facility: HOSPITAL | Age: 59
End: 2024-06-27
Payer: COMMERCIAL

## 2024-06-27 ENCOUNTER — HOSPITAL ENCOUNTER (OUTPATIENT)
Facility: HOSPITAL | Age: 59
Setting detail: HOSPITAL OUTPATIENT SURGERY
Discharge: HOME | End: 2024-06-27
Attending: INTERNAL MEDICINE | Admitting: INTERNAL MEDICINE
Payer: COMMERCIAL

## 2024-06-27 VITALS
RESPIRATION RATE: 17 BRPM | OXYGEN SATURATION: 98 % | TEMPERATURE: 97 F | BODY MASS INDEX: 27.44 KG/M2 | WEIGHT: 213.85 LBS | HEART RATE: 74 BPM | SYSTOLIC BLOOD PRESSURE: 135 MMHG | DIASTOLIC BLOOD PRESSURE: 71 MMHG

## 2024-06-27 DIAGNOSIS — K44.9 HIATAL HERNIA: ICD-10-CM

## 2024-06-27 DIAGNOSIS — K21.9 GASTROESOPHAGEAL REFLUX DISEASE WITHOUT ESOPHAGITIS: ICD-10-CM

## 2024-06-27 DIAGNOSIS — K22.70 BARRETT'S ESOPHAGUS WITHOUT DYSPLASIA: ICD-10-CM

## 2024-06-27 PROCEDURE — 88305 TISSUE EXAM BY PATHOLOGIST: CPT | Performed by: INTERNAL MEDICINE

## 2024-06-27 PROCEDURE — 43239 EGD BIOPSY SINGLE/MULTIPLE: CPT | Performed by: INTERNAL MEDICINE

## 2024-06-27 PROCEDURE — 25810000003 LACTATED RINGERS PER 1000 ML

## 2024-06-27 PROCEDURE — 25010000002 PROPOFOL 10 MG/ML EMULSION: Performed by: NURSE ANESTHETIST, CERTIFIED REGISTERED

## 2024-06-27 RX ORDER — SODIUM CHLORIDE, SODIUM LACTATE, POTASSIUM CHLORIDE, CALCIUM CHLORIDE 600; 310; 30; 20 MG/100ML; MG/100ML; MG/100ML; MG/100ML
30 INJECTION, SOLUTION INTRAVENOUS CONTINUOUS
Status: DISCONTINUED | OUTPATIENT
Start: 2024-06-27 | End: 2024-06-27 | Stop reason: HOSPADM

## 2024-06-27 RX ORDER — LIDOCAINE HYDROCHLORIDE 20 MG/ML
INJECTION, SOLUTION EPIDURAL; INFILTRATION; INTRACAUDAL; PERINEURAL AS NEEDED
Status: DISCONTINUED | OUTPATIENT
Start: 2024-06-27 | End: 2024-06-27 | Stop reason: SURG

## 2024-06-27 RX ORDER — PROPOFOL 10 MG/ML
VIAL (ML) INTRAVENOUS AS NEEDED
Status: DISCONTINUED | OUTPATIENT
Start: 2024-06-27 | End: 2024-06-27 | Stop reason: SURG

## 2024-06-27 RX ADMIN — PROPOFOL 100 MG: 10 INJECTION, EMULSION INTRAVENOUS at 07:25

## 2024-06-27 RX ADMIN — SODIUM CHLORIDE, POTASSIUM CHLORIDE, SODIUM LACTATE AND CALCIUM CHLORIDE 30 ML/HR: 600; 310; 30; 20 INJECTION, SOLUTION INTRAVENOUS at 06:43

## 2024-06-27 RX ADMIN — LIDOCAINE HYDROCHLORIDE 40 MG: 20 INJECTION, SOLUTION INTRAVENOUS at 07:25

## 2024-06-27 RX ADMIN — PROPOFOL 250 MCG/KG/MIN: 10 INJECTION, EMULSION INTRAVENOUS at 07:25

## 2024-06-27 NOTE — H&P
Pre Procedure History & Physical    Chief Complaint:   F/u of Montez's esophagus    Subjective     HPI:   58 yo M here for f/u of Montez's esophagus.    Past Medical History:   Past Medical History:   Diagnosis Date    Acid reflux     Montez's esophagus     Chronic rhinosinusitis     Chronic sinusitis     Esophageal erosions     High triglycerides     Hypothyroidism     NODULE-    Rebound headache     Testicular hypofunction        Past Surgical History:  Past Surgical History:   Procedure Laterality Date    BACK SURGERY  2000    lumbar    COLONOSCOPY  2020    ENDOSCOPY  2020    ENDOSCOPY N/A 6/29/2021    Procedure: ESOPHAGOGASTRODUODENOSCOPY WITH BIOPSY;  Surgeon: Bernice Hagen MD;  Location: [facility] ENDOSCOPY;  Service: Gastroenterology;  Laterality: N/A;  BARRETTS ESOPHAGUS/ HIATAL HERNIA    THYROIDECTOMY  2008    UPPER GASTROINTESTINAL ENDOSCOPY         Family History:  Family History   Problem Relation Age of Onset    Hypothyroidism Mother     Bone cancer Father     Diabetes Father     Colon cancer Maternal Grandmother     Heart disease Other     Malig Hyperthermia Neg Hx        Social History:   reports that he has never smoked. He has never used smokeless tobacco. He reports that he does not drink alcohol and does not use drugs.    Medications:   Medications Prior to Admission   Medication Sig Dispense Refill Last Dose    Flaxseed, Linseed, (Flaxseed Oil) oil Take  by mouth Daily.   6/26/2024    levothyroxine (SYNTHROID, LEVOTHROID) 125 MCG tablet levothyroxine 125 mcg oral tablet take 1 tablet (125 mcg) by oral route once daily   Active   6/27/2024    liothyronine (CYTOMEL) 25 MCG tablet Take 1 tablet by mouth Daily.   6/26/2024    pantoprazole (PROTONIX) 20 MG EC tablet Take 1 tablet by mouth Daily. 90 tablet 3 6/27/2024    tamsulosin (FLOMAX) 0.4 MG capsule 24 hr capsule 1 capsule Daily.   6/26/2024    Testosterone Cypionate (DEPOTESTOTERONE CYPIONATE) 200 MG/ML injection ADMINISTER 1/2 ML IN  THE MUSCLE WEEKLY   Past Week       Allergies:  Patient has no known allergies.    ROS:    Pertinent items are noted in HPI     Objective     Blood pressure 157/92, pulse 76, temperature 97.2 °F (36.2 °C), temperature source Temporal, resp. rate 18, weight 97 kg (213 lb 13.5 oz), SpO2 97%.    Physical Exam   Constitutional: Pt is oriented to person, place, and time and well-developed, well-nourished, and in no distress.   Mouth/Throat: Oropharynx is clear and moist.   Neck: Normal range of motion.   Cardiovascular: Normal rate, regular rhythm and normal heart sounds.    Pulmonary/Chest: Effort normal and breath sounds normal.   Abdominal: Soft. Nontender  Skin: Skin is warm and dry.   Psychiatric: Mood, memory, affect and judgment normal.     Assessment & Plan     Diagnosis:  F/u of Montez's esophagus    Anticipated Surgical Procedure:  EGD    The risks, benefits, and alternatives of this procedure have been discussed with the patient or the responsible party- the patient understands and agrees to proceed.

## 2024-06-27 NOTE — ANESTHESIA POSTPROCEDURE EVALUATION
Patient: Josiah Díaz II    Procedure Summary       Date: 06/27/24 Room / Location: [facility] ENDOSCOPY 3 / [facility] ENDOSCOPY    Anesthesia Start: 0723 Anesthesia Stop: 0740    Procedure: ESOPHAGOGASTRODUODENOSCOPY WITH BIOPSIES Diagnosis:       Montez's esophagus without dysplasia      Gastroesophageal reflux disease without esophagitis      Hiatal hernia      (Montez's esophagus without dysplasia [K22.70])      (Gastroesophageal reflux disease without esophagitis [K21.9])      (Hiatal hernia [K44.9])    Surgeons: Bernice Hagen MD Provider: Susan Del Rio CRNA    Anesthesia Type: general ASA Status: 2            Anesthesia Type: general    Vitals  Vitals Value Taken Time   /71 06/27/24 0759   Temp 36.1 °C (97 °F) 06/27/24 0759   Pulse 74 06/27/24 0759   Resp 17 06/27/24 0759   SpO2 98 % 06/27/24 0759           Post Anesthesia Care and Evaluation    Patient location during evaluation: bedside  Patient participation: complete - patient participated  Level of consciousness: awake  Pain management: adequate    Airway patency: patent  Anesthetic complications: No anesthetic complications  PONV Status: controlled  Cardiovascular status: acceptable and stable  Respiratory status: acceptable

## 2024-07-01 ENCOUNTER — TELEPHONE (OUTPATIENT)
Dept: GASTROENTEROLOGY | Facility: CLINIC | Age: 59
End: 2024-07-01
Payer: COMMERCIAL

## 2024-07-01 NOTE — TELEPHONE ENCOUNTER
----- Message from Bhavna Gallagher sent at 7/1/2024  9:47 AM EDT -----  EGD biopsies positive for reflux esophagitis with Montez's esophagus.  No dysplasia.  Recall EGD in 3 years for Montez's.  Continue PPI.  Avoid NSAIDs.  Please make office follow-up with me in the next 4 to 6 weeks.  Thanks

## 2024-07-05 NOTE — TELEPHONE ENCOUNTER
Patient was notified of results and recommendations. Patient verbalized understanding. Recall placed for 3 year EGD repeat. Patient scheduled for follow up after he returns from being out of town.

## (undated) DEVICE — Device: Brand: DEFENDO AIR/WATER/SUCTION AND BIOPSY VALVE

## (undated) DEVICE — SOLIDIFIER LIQLOC PLS 1500CC BT

## (undated) DEVICE — SOL IRRG H2O PL/BG 1000ML STRL

## (undated) DEVICE — BLCK/BITE BLOX WO/DENTL/RIM W/STRAP 54F

## (undated) DEVICE — SINGLE-USE BIOPSY FORCEPS: Brand: RADIAL JAW 4

## (undated) DEVICE — LINER SURG CANSTR SXN S/RIGD 1500CC

## (undated) DEVICE — EGD OR ERCP KIT: Brand: MEDLINE INDUSTRIES, INC.

## (undated) DEVICE — Device

## (undated) DEVICE — CONN JET HYDRA H20 AUXILIARY DISP